# Patient Record
Sex: FEMALE | Race: OTHER | Employment: OTHER
[De-identification: names, ages, dates, MRNs, and addresses within clinical notes are randomized per-mention and may not be internally consistent; named-entity substitution may affect disease eponyms.]

---

## 2017-01-06 DIAGNOSIS — N64.89 BREAST ASYMMETRY: Primary | ICD-10-CM

## 2017-01-16 DIAGNOSIS — N64.89 BREAST ASYMMETRY: ICD-10-CM

## 2017-01-19 ENCOUNTER — TELEPHONE (OUTPATIENT)
Dept: OBGYN | Facility: CLINIC | Age: 72
End: 2017-01-19

## 2017-01-27 ENCOUNTER — TELEPHONE (OUTPATIENT)
Dept: OBGYN | Facility: CLINIC | Age: 72
End: 2017-01-27

## 2017-12-20 ENCOUNTER — OFFICE VISIT (OUTPATIENT)
Dept: OBGYN CLINIC | Age: 72
End: 2017-12-20
Payer: COMMERCIAL

## 2017-12-20 VITALS
WEIGHT: 124.6 LBS | DIASTOLIC BLOOD PRESSURE: 90 MMHG | SYSTOLIC BLOOD PRESSURE: 146 MMHG | HEIGHT: 59 IN | BODY MASS INDEX: 25.12 KG/M2

## 2017-12-20 DIAGNOSIS — Z12.11 ENCOUNTER FOR FIT (FECAL IMMUNOCHEMICAL TEST) SCREENING: ICD-10-CM

## 2017-12-20 DIAGNOSIS — Z01.419 VISIT FOR GYNECOLOGIC EXAMINATION: Primary | ICD-10-CM

## 2017-12-20 PROCEDURE — G0101 CA SCREEN;PELVIC/BREAST EXAM: HCPCS | Performed by: OBSTETRICS & GYNECOLOGY

## 2017-12-20 RX ORDER — ANASTROZOLE 1 MG/1
1 TABLET ORAL DAILY
COMMUNITY

## 2017-12-20 ASSESSMENT — ENCOUNTER SYMPTOMS
COUGH: 0
SHORTNESS OF BREATH: 0
ABDOMINAL PAIN: 0
BACK PAIN: 0
CONSTIPATION: 0
ABDOMINAL DISTENTION: 0
DIARRHEA: 0

## 2017-12-20 NOTE — PROGRESS NOTES
Subjective:      Patient ID: Courtney Herron is a 67 y.o. female. HPI   Courtney Herron is a 67 y.o. [id number], here for her annual exam.  The patient was seen and examined. The patients past medical, surgical, social and family history were reviewed. Current medications and allergies were reviewed, and documented in the chart. The patient was diagnosed as breast cancer this past year, and had a lumpectomy by Dr. Melba Rivera. Her lymph nodes were negative, and she does not need to take chemo or radiation. The tumor was hormone receptor positive, and she is on Arimidex. She has no complaints otherwise, and good bladder function    Menopausal history: No bleeding, no hot flashes    Blood pressure (!) 146/90, height 4' 11.25\" (1.505 m), weight 124 lb 9.6 oz (56.5 kg), not currently breastfeeding. Wt Readings from Last 3 Encounters:   12/20/17 124 lb 9.6 oz (56.5 kg)   12/14/16 127 lb 3.2 oz (57.7 kg)   12/10/15 123 lb 3.2 oz (55.9 kg)     No results found for this or any previous visit (from the past 8736 hour(s)).   Past Medical History:   Diagnosis Date    Acid reflux     Breast cancer ([facility] [address] 01/16/2017    right    Chronic back pain     arthritis in back and hips    Diverticulitis     Elevated cholesterol     Osteopenia 12/21/15                                                                   Past Surgical History:   Procedure Laterality Date    CERVIX BIOPSY      cone    COLONOSCOPY  2010, 06/15    negative, negative    DILATION AND CURETTAGE  1977    IUD removal    LIVER BIOPSY      abcess    TUBAL LIGATION       Family History   Problem Relation Age of Onset    Breast Cancer Other     Colon Cancer Mother 48    Breast Cancer Sister 36    Diabetes Sister     Breast Cancer Sister 61    Diabetes Brother     Other Father      accident caused death   H[facility]el [place] Breast Cancer Maternal Aunt      History   Smoking Status    Never Smoker   Smokeless Tobacco    Never Used     History   Alcohol Use    Yes inguinal area. Genitourinary: No breast swelling, tenderness, discharge or bleeding. There is no rash or lesion on the right labia. There is no rash or lesion on the left labia. Uterus is not deviated, not enlarged and not fixed. Cervix exhibits no motion tenderness, no discharge and no friability. Right adnexum displays no mass, no tenderness and no fullness. Left adnexum displays no mass, no tenderness and no fullness. No tenderness in the vagina. No vaginal discharge found. Musculoskeletal: She exhibits no edema or tenderness. Lymphadenopathy:     She has no cervical adenopathy. Right: No inguinal adenopathy present. Left: No inguinal adenopathy present. Neurological: She is alert and oriented to person, place, and time. Skin: Skin is warm and dry. Psychiatric: She has a normal mood and affect. Her behavior is normal. Judgment and thought content normal.       Assessment:      Encounter Diagnoses   Name Primary?  Visit for gynecologic examination Yes    Encounter for FIT (fecal immunochemical test) screening            Plan:      1. Discussed new pap smear guidelines. 2. Screening mammogram discussed and advised yearly if within normal limits. 3. Calcium and Vitamin D dosing reviewed. 4. Colonoscopy screening reviewed. 5. Bone density testing reviewed.

## 2018-01-03 DIAGNOSIS — Z01.419 VISIT FOR GYNECOLOGIC EXAMINATION: ICD-10-CM

## 2019-01-16 ENCOUNTER — OFFICE VISIT (OUTPATIENT)
Dept: OBGYN CLINIC | Age: 74
End: 2019-01-16
Payer: COMMERCIAL

## 2019-01-16 VITALS
HEIGHT: 59 IN | BODY MASS INDEX: 24.31 KG/M2 | WEIGHT: 120.6 LBS | DIASTOLIC BLOOD PRESSURE: 60 MMHG | SYSTOLIC BLOOD PRESSURE: 120 MMHG

## 2019-01-16 DIAGNOSIS — Z01.419 VISIT FOR GYNECOLOGIC EXAMINATION: Primary | ICD-10-CM

## 2019-01-16 PROCEDURE — G0101 CA SCREEN;PELVIC/BREAST EXAM: HCPCS | Performed by: OBSTETRICS & GYNECOLOGY

## 2019-01-16 ASSESSMENT — ENCOUNTER SYMPTOMS
ABDOMINAL DISTENTION: 0
CONSTIPATION: 0
COUGH: 0
ABDOMINAL PAIN: 0
DIARRHEA: 0
BACK PAIN: 0
SHORTNESS OF BREATH: 0

## 2020-01-20 ENCOUNTER — OFFICE VISIT (OUTPATIENT)
Dept: OBGYN CLINIC | Age: 75
End: 2020-01-20
Payer: COMMERCIAL

## 2020-01-20 VITALS
HEIGHT: 58 IN | WEIGHT: 119.2 LBS | BODY MASS INDEX: 25.02 KG/M2 | DIASTOLIC BLOOD PRESSURE: 84 MMHG | SYSTOLIC BLOOD PRESSURE: 114 MMHG

## 2020-01-20 PROCEDURE — G0101 CA SCREEN;PELVIC/BREAST EXAM: HCPCS | Performed by: OBSTETRICS & GYNECOLOGY

## 2020-01-20 ASSESSMENT — ENCOUNTER SYMPTOMS
ABDOMINAL PAIN: 0
SHORTNESS OF BREATH: 0
COUGH: 0
DIARRHEA: 0
CONSTIPATION: 0
BACK PAIN: 0
ABDOMINAL DISTENTION: 0

## 2020-01-20 NOTE — PROGRESS NOTES
Subjective:      Patient ID: Fco Parikh is a 76 y.o. female. HPI   Fco Parikh is a 76 y.o. [id number], here for her annual exam.  The patient was seen and examined. The patients past medical, surgical, social and family history were reviewed. Current medications and allergies were reviewed, and documented in the chart. She is still seeing Dr. Eliot Espino for her breast cancer and is still taking Arimidex. She still has occasional hot flashes on the medication, but her breast cancer is clean  Her 2 daughters are doing well, 1 here and 1 in [place]    Menopausal history: No bleeding, occasional hot flashes from the Arimidex    Blood pressure 114/84, height 4' 10\" (1.473 m), weight 119 lb 3.2 oz (54.1 kg), not currently breastfeeding. Wt Readings from Last 3 Encounters:   01/20/20 119 lb 3.2 oz (54.1 kg)   01/16/19 120 lb 9.6 oz (54.7 kg)   12/20/17 124 lb 9.6 oz (56.5 kg)     No results found for this or any previous visit (from the past 8736 hour(s)).   Past Medical History:   Diagnosis Date    Acid reflux     Breast cancer ([facility] [address] 01/16/2017    right    Chronic back pain     arthritis in back and hips    Diverticulitis     Elevated cholesterol     Osteopenia 12/21/15                                                                   Past Surgical History:   Procedure Laterality Date    CERVIX BIOPSY      cone    COLONOSCOPY  2010    COLONOSCOPY  06/2015    DILATION AND CURETTAGE  1977    IUD removal    LIVER BIOPSY      abcess    TUBAL LIGATION       Family History   Problem Relation Age of Onset    Breast Cancer Other     Colon Cancer Mother 48    Breast Cancer Sister 36    Diabetes Sister     Breast Cancer Sister 61    Diabetes Brother     Other Father         accident caused death    Breast Cancer Maternal Aunt      Social History     Tobacco Use   Smoking Status Never Smoker   Smokeless Tobacco Never Used     Social History     Substance and Sexual Activity   Alcohol Use Yes    Comment: distension. Palpations: Abdomen is soft. There is no mass. Tenderness: There is no tenderness. Hernia: There is no hernia in the right inguinal area or left inguinal area. Genitourinary:     Labia:         Right: No rash or lesion. Left: No rash or lesion. Vagina: No vaginal discharge or tenderness. Cervix: No cervical motion tenderness, discharge or friability. Uterus: Not deviated, not enlarged and not fixed. Adnexa:         Right: No mass, tenderness or fullness. Left: No mass, tenderness or fullness. Musculoskeletal:         General: No tenderness. Lymphadenopathy:      Cervical: No cervical adenopathy. Skin:     General: Skin is warm and dry. Neurological:      Mental Status: She is alert and oriented to person, place, and time. Psychiatric:         Behavior: Behavior normal.         Thought Content: Thought content normal.         Judgment: Judgment normal.         Assessment:      Encounter Diagnoses   Name Primary?  Women's annual routine gynecological examination Yes    Encounter for screening mammogram for breast cancer     History of breast cancer            Plan:      1. Discussed new pap smear guidelines. 2. Screening mammogram discussed and advised yearly if within normal limits. 3. Calcium and Vitamin D dosing reviewed. 4. Colonoscopy screening reviewed. 5. Bone density testing reviewed.              Grecia Valdes MD

## 2021-02-11 ENCOUNTER — OFFICE VISIT (OUTPATIENT)
Dept: OBGYN CLINIC | Age: 76
End: 2021-02-11
Payer: COMMERCIAL

## 2021-02-11 ENCOUNTER — TELEPHONE (OUTPATIENT)
Dept: OBGYN CLINIC | Age: 76
End: 2021-02-11

## 2021-02-11 VITALS
HEIGHT: 58 IN | TEMPERATURE: 98.1 F | SYSTOLIC BLOOD PRESSURE: 132 MMHG | DIASTOLIC BLOOD PRESSURE: 84 MMHG | BODY MASS INDEX: 24.68 KG/M2 | WEIGHT: 117.6 LBS

## 2021-02-11 DIAGNOSIS — Z12.31 ENCOUNTER FOR SCREENING MAMMOGRAM FOR BREAST CANCER: ICD-10-CM

## 2021-02-11 DIAGNOSIS — Z01.419 WOMEN'S ANNUAL ROUTINE GYNECOLOGICAL EXAMINATION: Primary | ICD-10-CM

## 2021-02-11 DIAGNOSIS — Z85.3 HISTORY OF BREAST CANCER: ICD-10-CM

## 2021-02-11 PROCEDURE — G0101 CA SCREEN;PELVIC/BREAST EXAM: HCPCS | Performed by: STUDENT IN AN ORGANIZED HEALTH CARE EDUCATION/TRAINING PROGRAM

## 2021-02-11 RX ORDER — OMEGA-3 FATTY ACIDS/FISH OIL 300-1000MG
CAPSULE ORAL
COMMUNITY

## 2021-02-11 RX ORDER — ACETAMINOPHEN 325 MG/1
650 TABLET ORAL EVERY 6 HOURS PRN
COMMUNITY

## 2021-02-11 RX ORDER — OMEPRAZOLE 20 MG/1
20 CAPSULE, DELAYED RELEASE ORAL DAILY
COMMUNITY

## 2021-02-11 RX ORDER — LATANOPROST 50 UG/ML
SOLUTION/ DROPS OPHTHALMIC
COMMUNITY
Start: 2021-01-25

## 2021-02-11 NOTE — TELEPHONE ENCOUNTER
75 y/o Pt calling back states she received call from [facility]. yesterday her annual may not be covered. Pt states she called her insurance company and confirmed that she can have PAP every other year. Explained that it's the whole annual exam that is covered every other year and pap's are no longer needed. Explained that if she comes in and its not covered. ACOG guidelines have changed for all women as to when they need a pap. [facility] will normally give price break. Pt has decided that she'll come in knowing that visit may not be covered.

## 2021-02-11 NOTE — PROGRESS NOTES
[facility]   [facility] [address] [facility], [place] [address]      Toña Trinh  2021                       Primary Care Physician: Cali Howard MD    CC:   Chief Complaint   Patient presents with    Annual Exam     pap - neg FH sis 40, sis 60 and 2 neices with Br CA, maribel 17 f/u recommended          HPI: Toña Trinh is a 76 y.o. female  No LMP recorded. Patient is postmenopausal.    The patient was seen and examined. She is here for an annual visit. She is complaining of nothing. Patient has been menopausal for over 20 years and denies any vaginal bleeding since that time. Patient follows with Dr. Naren Enrique due to her history of breast cancer back in 2017. Dr. Naren Enrique orders her mammograms and DEXA scans. Patient had a colonoscopy 2 weeks ago which showed polyps but repeat will be needed in 5 years. Her bowel habits are regular. She denies any bloating. She denies dysuria. She denies urinary leaking. She denies vaginal discharge. She is sexually active with single partner, contraception - post menopausal status.        Depression Screen: Symptoms of decreased mood absent  Symptoms of anhedonia absent  **If either question is answered in a  positive fashion then complete the PHQ9 Scoring Evaluation and make the appropriate referral**    REVIEW OF SYSTEMS:   Constitutional: negative fever, negative chills  HEENT: negative visual disturbances, negative headaches  Respiratory: negative dyspnea, negative cough  Cardiovascular: negative chest pain,  negative palpitations  Gastrointestinal: negative abdominal pain, negative RUQ pain, negative N/V, negative diarrhea, negative constipation  Genitourinary: negative dysuria, negative vaginal discharge  Dermatological: negative rash  Hematologic: negative bruising  Immunologic/Lymphatic: negative recent illness, negative recent sick contact  Musculoskeletal: negative back pain, negative myalgias, negative arthralgias  Neurological: negative dizziness, negative weakness  Behavior/Psych: negative depression, negative anxiety      GYNECOLOGICAL HISTORY:  Age of Menarche: 15  Age of Menopause: N/A     STD History: no past history    Pap History: Last PAP was normal; 2017. Colposcopy History: admits to. Hx Cone biopsy. Permanent Sterilization: yes - tubal ligation  Reversible Birth Control: no  Hormone Replacement Exposure: no    OBSTETRICAL HISTORY:  OB History    Para Term  AB Living   2 2 2 0 0 2   SAB TAB Ectopic Molar Multiple Live Births   0 0 0 0 0 2      # Outcome Date GA Lbr Hector/2nd Weight Sex Delivery Anes PTL Lv   2 Term 12    F Vag-Spont   [place]   1 Term 0    F Vag-Spont   [place]       PAST MEDICAL HISTORY:   has a past medical history of Acid reflux, Breast cancer ([facility] [address] Chronic back pain, Diverticulitis, Elevated cholesterol, and Osteopenia. PAST SURGICAL HISTORY:   has a past surgical history that includes Tubal ligation; Dilation & curettage (); liver biopsy; Cervix biopsy; Colonoscopy (); and Colonoscopy (2015). ALLERGIES:  is allergic to milk-related compounds. MEDICATIONS:  Prior to Admission medications    Medication Sig Start Date End Date Taking?  Authorizing Provider   ibuprofen (ADVIL) 200 MG CAPS    Yes Historical Provider, MD   latanoprost (XALATAN) 0.005 % ophthalmic solution INSTILL 1 DROP IN [facility] EYE AT BEDTIME 21  Yes Historical Provider, MD   omeprazole (PRILOSEC) 20 MG delayed release capsule Take 20 mg by mouth daily   Yes Historical Provider, MD   acetaminophen (TYLENOL) 325 MG tablet Take 650 mg by mouth every 6 hours as needed   Yes Historical Provider, MD   Omega-3 Fatty Acids (OMEGA-3 FISH OIL PO) Take by mouth   Yes Historical Provider, MD   anastrozole (ARIMIDEX) 1 MG tablet Take 1 mg by mouth daily   Yes Historical Provider, MD   Psyllium (METAMUCIL PO) Take by mouth   Yes Historical Provider, MD   calcium carbonate (OSCAL) 500 MG TABS tablet Take 500 mg by mouth daily   Yes Historical Provider, MD   ascorbic acid (VITAMIN C) 500 MG tablet Take 500 mg by mouth daily   Yes Historical Provider, MD   simvastatin (ZOCOR) 20 MG tablet Take 20 mg by mouth nightly. Yes Historical Provider, MD       FAMILY HISTORY:  Family History of Breast, Ovarian, Colon or Uterine Cancer: Yes sister and nieces   family history includes Breast Cancer in her maternal aunt and another family member; Breast Cancer (age of onset: 36) in her sister; Breast Cancer (age of onset: 61) in her sister; Colon Cancer (age of onset: 48) in her mother; Diabetes in her brother, brother, and sister; Other in her father. SOCIAL HISTORY:   reports that she has never smoked. She has never used smokeless tobacco. She reports current alcohol use. She reports that she does not use drugs. HEALTH MAINTENANCE:  Immunization status: up to date and documented    ROUTINE GYN HEALTH MAINTENANCE  Mammogram: Last 12/2020 WNL per patient. Next due in 6/2021. Colonoscopy: Last 1/2021 with 1 polyp. Next due 2026. Pap Smears: Last 12/2017 Neg. Discontinued per ASCCP guidelines. Family Planning: Tubal  DEXA: Last 2017 Osteopenia. Next due 4/2021. VITALS:  Vitals:    02/11/21 0937   BP: 132/84   Site: Right Upper Arm   Position: Sitting   Cuff Size: Medium Adult   Temp: 98.1 °F (36.7 °C)   Weight: 117 lb 9.6 oz (53.3 kg)   Height: 4' 10\" (1.473 m)                                                                                                                                                                                                      PHYSICAL EXAM:   General Appearance: Appears healthy. Alert; in no acute distress. Pleasant. Skin: Skin color, texture, turgor normal. No rashes or lesions. HEENT: normocephalic and atraumatic, Thyroid normal to inspection and palpation  Respiratory: Normal expansion. Clear to auscultation. No rales, rhonchi, or wheezing.   Cardiovascular: normal rate, normal S1 and S2, no gallops, intact distal pulses and no carotid bruits  Breast:  (Chest): normal appearance, no masses or tenderness  Abdomen: soft, non-tender, non-distended, no right upper quadrant tenderness and no CVA tenderness  Pelvic Exam:   External genitalia: General appearance; normal, Hair distribution; normal, Lesions absent  Urinary system: urethral meatus normal  Vaginal: normal mucosa, no discharge  Cervix: normal appearing cervix without discharge or lesions  Adnexa: non-palpable  Uterus: normal single, nontender  Rectal Exam: exam declined by patient  Musculoskeletal: no gross abnormalities  Extremities: non-tender BLE and non-edematous  Psych:  oriented to time, place and person     DATA:  No results found for this visit on 21. ASSESSMENT & PLAN:    Jovanna Marrero is a 76 y.o. female  No LMP recorded. Patient is postmenopausal.    Annual:   Mammogram: Last 2020 WNL per patient. Next due in 2021. Colonoscopy: Last 2021 with 1 polyp. Next due . Pap Smears: Last 2017 Neg. Discontinued per ASCCP guidelines. Family Planning: Tubal   DEXA: Last  Osteopenia. Next due 2021. Patient Active Problem List    Diagnosis Date Noted    Osteopenia 2015     Priority: Low    History of breast cancer 2021       Return in about 2 years (around 2023) for Annual.  No Patient Care Coordination Note on file. Counseling Completed:    Discussed need for repeat pap as per American Society for Colposcopy and Cervical Pathology guidelines. Discussed need for mammograms every 1 year, If >44 yo and last mammogram was negative. Discussed Calcium and Vitamin D dosing. Discussed need for colonoscopy screening as well as onset for bone density testing. Discussed birth control and barrier recommendations. Discussed STD counseling and prevention. Discussed Gardisil counseling for all patients 10-35 yo. Hereditary Breast, Ovarian, Colon and Uterine Cancer screening discussed. Tobacco & Secondary smoke risks discussed; with recommendation for cessation and avoidance. Routine health maintenance per patients PCP discussed. Diagnosis Orders   1. Women's annual routine gynecological examination     2. Encounter for screening mammogram for breast cancer     3.  History of breast cancer          Didi [facility] [address] [facility], [address]  2/11/2021, 10:17 AM

## 2022-02-15 ENCOUNTER — OFFICE VISIT (OUTPATIENT)
Dept: OBGYN CLINIC | Age: 77
End: 2022-02-15
Payer: COMMERCIAL

## 2022-02-15 VITALS
BODY MASS INDEX: 24.49 KG/M2 | HEART RATE: 74 BPM | SYSTOLIC BLOOD PRESSURE: 194 MMHG | DIASTOLIC BLOOD PRESSURE: 98 MMHG | WEIGHT: 117.2 LBS

## 2022-02-15 DIAGNOSIS — Z01.419 WELL WOMAN EXAM WITH ROUTINE GYNECOLOGICAL EXAM: Primary | ICD-10-CM

## 2022-02-15 DIAGNOSIS — Z12.31 SCREENING MAMMOGRAM FOR BREAST CANCER: ICD-10-CM

## 2022-02-15 DIAGNOSIS — Z78.0 POSTMENOPAUSAL: ICD-10-CM

## 2022-02-15 PROCEDURE — G0101 CA SCREEN;PELVIC/BREAST EXAM: HCPCS | Performed by: STUDENT IN AN ORGANIZED HEALTH CARE EDUCATION/TRAINING PROGRAM

## 2022-02-15 NOTE — PROGRESS NOTES
[address] [facility]  [address] [facility], [address] [place] [address]      Jeovanny Esquivle  2/15/2022                       Primary Care Physician: Debra Sanchez MD    CC:   Chief Complaint   Patient presents with    Annual Exam         HPI: Jeovanny Esquivel is a 68 y.o. female  No LMP recorded. Patient is postmenopausal.    The patient was seen and examined. She is here for an annual visit. She is complaining of nothing. Denies bleeding since going through menopause. Her bowel habits are regular. She denies any bloating. She denies dysuria. She denies urinary leaking. She denies vaginal discharge. Patient has a history of breast cancer and she follows Dr. Charissa Phillips. However, Dr. Charissa Phillips is retiring and she will be transitioning her care to our office. Patient is currently on anastrozole but will be discontinuing that come in .     Depression Screen: Symptoms of decreased mood absent  Symptoms of anhedonia absent  **If either question is answered in a  positive fashion then complete the PHQ9 Scoring Evaluation and make the appropriate referral**    REVIEW OF SYSTEMS:   Constitutional: negative fever, negative chills  HEENT: negative visual disturbances, negative headaches  Respiratory: negative dyspnea, negative cough  Cardiovascular: negative chest pain,  negative palpitations  Gastrointestinal: negative abdominal pain, negative RUQ pain, negative N/V, negative diarrhea, negative constipation  Genitourinary: negative dysuria, negative vaginal discharge  Dermatological: negative rash  Hematologic: negative bruising  Immunologic/Lymphatic: negative recent illness, negative recent sick contact  Musculoskeletal: negative back pain, negative myalgias, negative arthralgias  Neurological:  negative dizziness, negative weakness  Behavior/Psych: negative depression, negative anxiety      GYNECOLOGICAL HISTORY:  Age of Menarche: 15  Age of Menopause: 46    STD History: no past history    Permanent Sterilization: yes - tubal ligation  Reversible Birth Control: no  Hormone Replacement Exposure: no    OBSTETRICAL HISTORY:  OB History    Para Term  AB Living   2 2 2 0 0 2   SAB IAB Ectopic Molar Multiple Live Births   0 0 0 0 0 2      # Outcome Date GA Lbr Hector/2nd Weight Sex Delivery Anes PTL Lv   2 Term 12    F Vag-Spont   [place]   1 Term 0    F Vag-Spont   [place]       PAST MEDICAL HISTORY:   has a past medical history of Acid reflux, Breast cancer ([address] Chronic back pain, Diverticulitis, Elevated cholesterol, and Osteopenia. PAST SURGICAL HISTORY:   has a past surgical history that includes Tubal ligation; Dilation & curettage (); liver biopsy; Cervix biopsy; Colonoscopy (); and Colonoscopy (2015). ALLERGIES:  is allergic to milk-related compounds and morphine. MEDICATIONS:  Prior to Admission medications    Medication Sig Start Date End Date Taking? Authorizing Provider   ibuprofen (ADVIL) 200 MG CAPS    Yes Historical Provider, MD   latanoprost (XALATAN) 0.005 % ophthalmic solution INSTILL 1 DROP IN [facility] EYE AT BEDTIME 21  Yes Historical Provider, MD   omeprazole (PRILOSEC) 20 MG delayed release capsule Take 20 mg by mouth daily   Yes Historical Provider, MD   acetaminophen (TYLENOL) 325 MG tablet Take 650 mg by mouth every 6 hours as needed   Yes Historical Provider, MD   Omega-3 Fatty Acids (OMEGA-3 FISH OIL PO) Take by mouth   Yes Historical Provider, MD   anastrozole (ARIMIDEX) 1 MG tablet Take 1 mg by mouth daily   Yes Historical Provider, MD   Psyllium (METAMUCIL PO) Take by mouth   Yes Historical Provider, MD   calcium carbonate (OSCAL) 500 MG TABS tablet Take 500 mg by mouth daily   Yes Historical Provider, MD   ascorbic acid (VITAMIN C) 500 MG tablet Take 500 mg by mouth daily   Yes Historical Provider, MD   simvastatin (ZOCOR) 20 MG tablet Take 20 mg by mouth nightly.      Yes Historical Provider, MD       FAMILY HISTORY:  Family History of Breast, Ovarian, Colon or Uterine Cancer: Yes FHx breast CA   family history includes Breast Cancer in her maternal aunt and another family member; Breast Cancer (age of onset: 36) in her sister; Breast Cancer (age of onset: 61) in her sister; Colon Cancer (age of onset: 48) in her mother; Diabetes in her brother, brother, and sister; Other in her father. SOCIAL HISTORY:   reports that she has never smoked. She has never used smokeless tobacco. She reports current alcohol use. She reports that she does not use drugs. HEALTH MAINTENANCE:  Immunization status: up to date and documented    [address] [place] [address]: BIRADS 1 6/21. Next due 6/22. Ordered today. Colonoscopy: Neg 2/21. Next due 2026. Pap Smears: Last 2017 Neg. Discontinued due to age. Family Planning: Tubal  DEXA: Osteopenia 2020 per patient. Ordered today. VITALS:  Vitals:    02/15/22 1026 02/15/22 1105   BP: (!) 189/102 (!) 194/98   Site: Right Upper Arm Left Upper Arm   Position: Sitting Sitting   Cuff Size: Medium Adult Medium Adult   Pulse: 70 74   Weight: 117 lb 3.2 oz (53.2 kg)                                                                                                                                                                                                         PHYSICAL EXAM:   General Appearance: Appears healthy. Alert; in no acute distress. Pleasant. Skin: Skin color, texture, turgor normal. No rashes or lesions. HEENT: normocephalic and atraumatic, Thyroid normal to inspection and palpation  Respiratory: Normal expansion. Clear to auscultation. No rales, rhonchi, or wheezing.   Cardiovascular: normal rate, normal S1 and S2, no gallops, intact distal pulses and no carotid bruits  Breast:  (Chest): Declined  Abdomen: soft, non-tender, non-distended, no right upper quadrant tenderness and no CVA tenderness  Pelvic Exam:   External genitalia: General appearance; normal, Hair distribution; normal, Lesions absent  Urinary system: urethral meatus normal  Vaginal: normal mucosa, no discharge  Cervix: normal appearing cervix without discharge or lesions  Adnexa: non-palpable  Uterus: normal single, nontender  Rectal Exam: exam declined by patient  Musculoskeletal: no gross abnormalities  Extremities: non-tender BLE and non-edematous  Psych:  oriented to time, place and person     DATA:  No results found for this visit on 02/15/22. ASSESSMENT & PLAN:    Lew Morris is a 68 y.o. female  No LMP recorded. Patient is postmenopausal.      Annual:   Mammogram: BIRADS 1 . Next due . Ordered today. Colonoscopy: Neg . Next due . Pap Smears: Last  Neg. Discontinued due to age. Family Planning: Tubal   DEXA: Osteopenia  per patient. Ordered today. Patient Active Problem List    Diagnosis Date Noted    Osteopenia 2015     Priority: Low    History of breast cancer 2021       Return in about 1 year (around 2/15/2023) for Annual.  No Patient Care Coordination Note on file. Counseling Completed:    Discussed need for repeat pap as per American Society for Colposcopy and Cervical Pathology guidelines. Discussed need for mammograms every 1 year, If >44 yo and last mammogram was negative. Discussed Calcium and Vitamin D dosing. Discussed need for colonoscopy screening as well as onset for bone density testing. Discussed birth control and barrier recommendations. Discussed STD counseling and prevention. Discussed Gardisil counseling for all patients 10-37 yo. Hereditary Breast, Ovarian, Colon and Uterine Cancer screening discussed. Tobacco & Secondary smoke risks discussed; with recommendation for cessation and avoidance. Routine health maintenance per patients PCP discussed. Diagnosis Orders   1. Well woman exam with routine gynecological exam     2. Screening mammogram for breast cancer  JUAN CARLOS KISHAN DIGITAL SCREEN BILATERAL   3.  Postmenopausal  DEXA BONE DENSITY 2 SITES        Jose Viveros, [address] [facility], [address]  2/15/2022, 11:21 AM

## 2023-05-15 DIAGNOSIS — Z13.820 OSTEOPOROSIS SCREENING: ICD-10-CM

## 2023-05-15 DIAGNOSIS — Z12.31 SCREENING MAMMOGRAM FOR HIGH-RISK PATIENT: Primary | ICD-10-CM

## 2023-05-15 DIAGNOSIS — Z85.3 PERSONAL HISTORY OF BREAST CANCER: ICD-10-CM

## 2023-05-15 DIAGNOSIS — Z78.0 MENOPAUSE: ICD-10-CM

## 2023-07-27 DIAGNOSIS — Z78.0 MENOPAUSE: ICD-10-CM

## 2023-07-27 DIAGNOSIS — Z13.820 OSTEOPOROSIS SCREENING: ICD-10-CM

## 2023-08-02 DIAGNOSIS — Z85.3 PERSONAL HISTORY OF BREAST CANCER: ICD-10-CM

## 2023-08-02 DIAGNOSIS — Z12.31 SCREENING MAMMOGRAM FOR HIGH-RISK PATIENT: ICD-10-CM

## 2024-06-18 ENCOUNTER — OFFICE VISIT (OUTPATIENT)
Dept: OBGYN CLINIC | Age: 79
End: 2024-06-18
Payer: COMMERCIAL

## 2024-06-18 VITALS
HEART RATE: 80 BPM | WEIGHT: 112.4 LBS | BODY MASS INDEX: 23.59 KG/M2 | DIASTOLIC BLOOD PRESSURE: 80 MMHG | HEIGHT: 58 IN | SYSTOLIC BLOOD PRESSURE: 130 MMHG

## 2024-06-18 DIAGNOSIS — Z12.31 ENCOUNTER FOR SCREENING MAMMOGRAM FOR MALIGNANT NEOPLASM OF BREAST: ICD-10-CM

## 2024-06-18 DIAGNOSIS — Z78.0 POSTMENOPAUSAL: ICD-10-CM

## 2024-06-18 DIAGNOSIS — Z01.419 WOMEN'S ANNUAL ROUTINE GYNECOLOGICAL EXAMINATION: Primary | ICD-10-CM

## 2024-06-18 PROCEDURE — G0101 CA SCREEN;PELVIC/BREAST EXAM: HCPCS | Performed by: STUDENT IN AN ORGANIZED HEALTH CARE EDUCATION/TRAINING PROGRAM

## 2024-06-18 NOTE — PROGRESS NOTES
Chaperone for Intimate Exam  Chaperone was offered as part of the rooming process. Patient declined and agrees to continue with exam without a chaperone.  Chaperone: NONE

## 2024-06-18 NOTE — PROGRESS NOTES
[facility]  [facility]  [address]  [place] [address]      Delma Guadarrama  2024                       Primary Care Physician: Bryce Ronquillo MD    CC:   Chief Complaint   Patient presents with    Annual Exam         HPI: Delma Guadarrama is a 78 y.o. female  No LMP recorded. Patient is postmenopausal.    The patient was seen and examined. She is here for an annual visit. She is complaining of nothing.     Patient is menopausal and denies any bleeding since that time.     Her bowel habits are regular. She denies any bloating.  She denies dysuria. She denies urinary leaking.  She denies vaginal discharge.      Depression Screen: Symptoms of decreased mood absent  Symptoms of anhedonia absent  **If either question is answered in a  positive fashion then complete the PHQ9 Scoring Evaluation and make the appropriate referral**    REVIEW OF SYSTEMS:   Constitutional: negative fever, negative chills  HEENT: negative visual disturbances, negative headaches  Respiratory: negative dyspnea, negative cough  Cardiovascular: negative chest pain,  negative palpitations  Gastrointestinal: negative abdominal pain, negative RUQ pain, negative N/V, negative diarrhea, negative constipation  Genitourinary: negative dysuria, negative vaginal discharge  Dermatological: negative rash  Hematologic: negative bruising  Immunologic/Lymphatic: negative recent illness, negative recent sick contact  Musculoskeletal: negative back pain, negative myalgias, negative arthralgias  Neurological:  negative dizziness, negative weakness  Behavior/Psych: negative depression, negative anxiety      GYNECOLOGICAL HISTORY:  Age of Menarche: 12  Age of Menopause: 52     STD History: no past history    Permanent Sterilization: yes - tubal ligation  Reversible Birth Control: no  Hormone Replacement Exposure: no    OBSTETRICAL HISTORY:  OB History    Para Term  AB Living   2 2 2 0 0 2   SAB IAB